# Patient Record
Sex: MALE | URBAN - METROPOLITAN AREA
[De-identification: names, ages, dates, MRNs, and addresses within clinical notes are randomized per-mention and may not be internally consistent; named-entity substitution may affect disease eponyms.]

---

## 2018-12-03 ENCOUNTER — AMBULATORY - HEALTHEAST (OUTPATIENT)
Dept: ADDICTION MEDICINE | Facility: CLINIC | Age: 33
End: 2018-12-03

## 2018-12-03 ENCOUNTER — OFFICE VISIT - HEALTHEAST (OUTPATIENT)
Dept: ADDICTION MEDICINE | Facility: CLINIC | Age: 33
End: 2018-12-03

## 2018-12-03 DIAGNOSIS — F12.20 CANNABIS USE DISORDER, SEVERE, DEPENDENCE (H): ICD-10-CM

## 2018-12-03 DIAGNOSIS — F10.20 SEVERE ALCOHOL USE DISORDER (H): ICD-10-CM

## 2018-12-04 ENCOUNTER — AMBULATORY - HEALTHEAST (OUTPATIENT)
Dept: ADDICTION MEDICINE | Facility: CLINIC | Age: 33
End: 2018-12-04

## 2018-12-05 ENCOUNTER — AMBULATORY - HEALTHEAST (OUTPATIENT)
Dept: ADDICTION MEDICINE | Facility: CLINIC | Age: 33
End: 2018-12-05

## 2018-12-05 ENCOUNTER — OFFICE VISIT - HEALTHEAST (OUTPATIENT)
Dept: ADDICTION MEDICINE | Facility: CLINIC | Age: 33
End: 2018-12-05

## 2018-12-05 DIAGNOSIS — F12.20 CANNABIS USE DISORDER, SEVERE, DEPENDENCE (H): ICD-10-CM

## 2018-12-06 ENCOUNTER — OFFICE VISIT - HEALTHEAST (OUTPATIENT)
Dept: ADDICTION MEDICINE | Facility: CLINIC | Age: 33
End: 2018-12-06

## 2018-12-06 DIAGNOSIS — F12.20 CANNABIS USE DISORDER, SEVERE, DEPENDENCE (H): ICD-10-CM

## 2018-12-07 ENCOUNTER — OFFICE VISIT - HEALTHEAST (OUTPATIENT)
Dept: ADDICTION MEDICINE | Facility: CLINIC | Age: 33
End: 2018-12-07

## 2018-12-07 DIAGNOSIS — F12.20 CANNABIS USE DISORDER, SEVERE, DEPENDENCE (H): ICD-10-CM

## 2018-12-10 ENCOUNTER — OFFICE VISIT - HEALTHEAST (OUTPATIENT)
Dept: ADDICTION MEDICINE | Facility: CLINIC | Age: 33
End: 2018-12-10

## 2018-12-10 DIAGNOSIS — F12.20 CANNABIS USE DISORDER, SEVERE, DEPENDENCE (H): ICD-10-CM

## 2018-12-11 ENCOUNTER — AMBULATORY - HEALTHEAST (OUTPATIENT)
Dept: ADDICTION MEDICINE | Facility: CLINIC | Age: 33
End: 2018-12-11

## 2018-12-13 ENCOUNTER — OFFICE VISIT - HEALTHEAST (OUTPATIENT)
Dept: ADDICTION MEDICINE | Facility: CLINIC | Age: 33
End: 2018-12-13

## 2018-12-13 DIAGNOSIS — F12.20 CANNABIS USE DISORDER, SEVERE, DEPENDENCE (H): ICD-10-CM

## 2018-12-14 ENCOUNTER — OFFICE VISIT - HEALTHEAST (OUTPATIENT)
Dept: ADDICTION MEDICINE | Facility: CLINIC | Age: 33
End: 2018-12-14

## 2018-12-14 DIAGNOSIS — F12.20 CANNABIS USE DISORDER, SEVERE, DEPENDENCE (H): ICD-10-CM

## 2018-12-17 ENCOUNTER — OFFICE VISIT - HEALTHEAST (OUTPATIENT)
Dept: ADDICTION MEDICINE | Facility: CLINIC | Age: 33
End: 2018-12-17

## 2018-12-17 DIAGNOSIS — F12.20 CANNABIS USE DISORDER, SEVERE, DEPENDENCE (H): ICD-10-CM

## 2018-12-18 ENCOUNTER — AMBULATORY - HEALTHEAST (OUTPATIENT)
Dept: ADDICTION MEDICINE | Facility: CLINIC | Age: 33
End: 2018-12-18

## 2018-12-19 ENCOUNTER — OFFICE VISIT - HEALTHEAST (OUTPATIENT)
Dept: ADDICTION MEDICINE | Facility: CLINIC | Age: 33
End: 2018-12-19

## 2018-12-19 DIAGNOSIS — F12.20 CANNABIS USE DISORDER, SEVERE, DEPENDENCE (H): ICD-10-CM

## 2018-12-20 ENCOUNTER — OFFICE VISIT - HEALTHEAST (OUTPATIENT)
Dept: ADDICTION MEDICINE | Facility: CLINIC | Age: 33
End: 2018-12-20

## 2018-12-20 DIAGNOSIS — F12.20 CANNABIS USE DISORDER, SEVERE, DEPENDENCE (H): ICD-10-CM

## 2018-12-21 ENCOUNTER — OFFICE VISIT - HEALTHEAST (OUTPATIENT)
Dept: ADDICTION MEDICINE | Facility: CLINIC | Age: 33
End: 2018-12-21

## 2018-12-21 DIAGNOSIS — F12.20 CANNABIS USE DISORDER, SEVERE, DEPENDENCE (H): ICD-10-CM

## 2018-12-24 ENCOUNTER — AMBULATORY - HEALTHEAST (OUTPATIENT)
Dept: ADDICTION MEDICINE | Facility: CLINIC | Age: 33
End: 2018-12-24

## 2018-12-24 ENCOUNTER — OFFICE VISIT - HEALTHEAST (OUTPATIENT)
Dept: ADDICTION MEDICINE | Facility: CLINIC | Age: 33
End: 2018-12-24

## 2018-12-24 DIAGNOSIS — F12.20 CANNABIS USE DISORDER, SEVERE, DEPENDENCE (H): ICD-10-CM

## 2018-12-26 ENCOUNTER — OFFICE VISIT - HEALTHEAST (OUTPATIENT)
Dept: ADDICTION MEDICINE | Facility: CLINIC | Age: 33
End: 2018-12-26

## 2018-12-26 DIAGNOSIS — F12.20 CANNABIS USE DISORDER, SEVERE, DEPENDENCE (H): ICD-10-CM

## 2018-12-27 ENCOUNTER — OFFICE VISIT - HEALTHEAST (OUTPATIENT)
Dept: ADDICTION MEDICINE | Facility: CLINIC | Age: 33
End: 2018-12-27

## 2018-12-27 DIAGNOSIS — F12.20 CANNABIS USE DISORDER, SEVERE, DEPENDENCE (H): ICD-10-CM

## 2018-12-28 ENCOUNTER — OFFICE VISIT - HEALTHEAST (OUTPATIENT)
Dept: ADDICTION MEDICINE | Facility: CLINIC | Age: 33
End: 2018-12-28

## 2018-12-28 DIAGNOSIS — F12.20 CANNABIS USE DISORDER, SEVERE, DEPENDENCE (H): ICD-10-CM

## 2018-12-31 ENCOUNTER — OFFICE VISIT - HEALTHEAST (OUTPATIENT)
Dept: ADDICTION MEDICINE | Facility: CLINIC | Age: 33
End: 2018-12-31

## 2018-12-31 DIAGNOSIS — F12.20 CANNABIS USE DISORDER, SEVERE, DEPENDENCE (H): ICD-10-CM

## 2019-01-02 ENCOUNTER — OFFICE VISIT - HEALTHEAST (OUTPATIENT)
Dept: ADDICTION MEDICINE | Facility: CLINIC | Age: 34
End: 2019-01-02

## 2019-01-02 ENCOUNTER — AMBULATORY - HEALTHEAST (OUTPATIENT)
Dept: ADDICTION MEDICINE | Facility: CLINIC | Age: 34
End: 2019-01-02

## 2019-01-02 DIAGNOSIS — F12.20 CANNABIS USE DISORDER, SEVERE, DEPENDENCE (H): ICD-10-CM

## 2019-01-03 ENCOUNTER — OFFICE VISIT - HEALTHEAST (OUTPATIENT)
Dept: ADDICTION MEDICINE | Facility: CLINIC | Age: 34
End: 2019-01-03

## 2019-01-03 DIAGNOSIS — F12.20 CANNABIS USE DISORDER, SEVERE, DEPENDENCE (H): ICD-10-CM

## 2019-01-04 ENCOUNTER — AMBULATORY - HEALTHEAST (OUTPATIENT)
Dept: ADDICTION MEDICINE | Facility: CLINIC | Age: 34
End: 2019-01-04

## 2019-01-04 ENCOUNTER — OFFICE VISIT - HEALTHEAST (OUTPATIENT)
Dept: ADDICTION MEDICINE | Facility: CLINIC | Age: 34
End: 2019-01-04

## 2019-01-04 DIAGNOSIS — F12.20 CANNABIS USE DISORDER, SEVERE, DEPENDENCE (H): ICD-10-CM

## 2019-01-07 ENCOUNTER — OFFICE VISIT - HEALTHEAST (OUTPATIENT)
Dept: ADDICTION MEDICINE | Facility: CLINIC | Age: 34
End: 2019-01-07

## 2019-01-07 DIAGNOSIS — F12.20 CANNABIS USE DISORDER, SEVERE, DEPENDENCE (H): ICD-10-CM

## 2019-01-08 ENCOUNTER — COMMUNICATION - HEALTHEAST (OUTPATIENT)
Dept: ADDICTION MEDICINE | Facility: CLINIC | Age: 34
End: 2019-01-08

## 2019-01-08 ENCOUNTER — AMBULATORY - HEALTHEAST (OUTPATIENT)
Dept: ADDICTION MEDICINE | Facility: CLINIC | Age: 34
End: 2019-01-08

## 2019-01-09 ENCOUNTER — OFFICE VISIT - HEALTHEAST (OUTPATIENT)
Dept: ADDICTION MEDICINE | Facility: CLINIC | Age: 34
End: 2019-01-09

## 2019-01-09 DIAGNOSIS — F12.20 CANNABIS USE DISORDER, SEVERE, DEPENDENCE (H): ICD-10-CM

## 2019-01-10 ENCOUNTER — OFFICE VISIT - HEALTHEAST (OUTPATIENT)
Dept: ADDICTION MEDICINE | Facility: CLINIC | Age: 34
End: 2019-01-10

## 2019-01-10 DIAGNOSIS — F12.20 CANNABIS USE DISORDER, SEVERE, DEPENDENCE (H): ICD-10-CM

## 2019-01-11 ENCOUNTER — OFFICE VISIT - HEALTHEAST (OUTPATIENT)
Dept: ADDICTION MEDICINE | Facility: CLINIC | Age: 34
End: 2019-01-11

## 2019-01-11 DIAGNOSIS — F12.20 CANNABIS USE DISORDER, SEVERE, DEPENDENCE (H): ICD-10-CM

## 2019-01-14 ENCOUNTER — OFFICE VISIT - HEALTHEAST (OUTPATIENT)
Dept: ADDICTION MEDICINE | Facility: CLINIC | Age: 34
End: 2019-01-14

## 2019-01-14 DIAGNOSIS — F12.20 CANNABIS USE DISORDER, SEVERE, DEPENDENCE (H): ICD-10-CM

## 2019-01-15 ENCOUNTER — AMBULATORY - HEALTHEAST (OUTPATIENT)
Dept: ADDICTION MEDICINE | Facility: CLINIC | Age: 34
End: 2019-01-15

## 2019-01-16 ENCOUNTER — OFFICE VISIT - HEALTHEAST (OUTPATIENT)
Dept: ADDICTION MEDICINE | Facility: CLINIC | Age: 34
End: 2019-01-16

## 2019-01-16 DIAGNOSIS — F12.20 CANNABIS USE DISORDER, SEVERE, DEPENDENCE (H): ICD-10-CM

## 2019-01-17 ENCOUNTER — OFFICE VISIT - HEALTHEAST (OUTPATIENT)
Dept: ADDICTION MEDICINE | Facility: CLINIC | Age: 34
End: 2019-01-17

## 2019-01-17 DIAGNOSIS — F12.20 CANNABIS USE DISORDER, SEVERE, DEPENDENCE (H): ICD-10-CM

## 2019-01-18 ENCOUNTER — OFFICE VISIT - HEALTHEAST (OUTPATIENT)
Dept: ADDICTION MEDICINE | Facility: CLINIC | Age: 34
End: 2019-01-18

## 2019-01-18 DIAGNOSIS — F12.20 CANNABIS USE DISORDER, SEVERE, DEPENDENCE (H): ICD-10-CM

## 2019-01-21 ENCOUNTER — OFFICE VISIT - HEALTHEAST (OUTPATIENT)
Dept: ADDICTION MEDICINE | Facility: CLINIC | Age: 34
End: 2019-01-21

## 2019-01-21 DIAGNOSIS — F12.20 CANNABIS USE DISORDER, SEVERE, DEPENDENCE (H): ICD-10-CM

## 2019-01-22 ENCOUNTER — AMBULATORY - HEALTHEAST (OUTPATIENT)
Dept: ADDICTION MEDICINE | Facility: CLINIC | Age: 34
End: 2019-01-22

## 2019-01-23 ENCOUNTER — OFFICE VISIT - HEALTHEAST (OUTPATIENT)
Dept: ADDICTION MEDICINE | Facility: CLINIC | Age: 34
End: 2019-01-23

## 2019-01-23 DIAGNOSIS — F12.20 CANNABIS USE DISORDER, SEVERE, DEPENDENCE (H): ICD-10-CM

## 2019-01-28 ENCOUNTER — OFFICE VISIT - HEALTHEAST (OUTPATIENT)
Dept: ADDICTION MEDICINE | Facility: CLINIC | Age: 34
End: 2019-01-28

## 2019-01-28 DIAGNOSIS — F12.20 CANNABIS USE DISORDER, SEVERE, DEPENDENCE (H): ICD-10-CM

## 2019-01-29 ENCOUNTER — AMBULATORY - HEALTHEAST (OUTPATIENT)
Dept: ADDICTION MEDICINE | Facility: CLINIC | Age: 34
End: 2019-01-29

## 2019-01-31 ENCOUNTER — AMBULATORY - HEALTHEAST (OUTPATIENT)
Dept: ADDICTION MEDICINE | Facility: CLINIC | Age: 34
End: 2019-01-31

## 2019-01-31 ENCOUNTER — OFFICE VISIT - HEALTHEAST (OUTPATIENT)
Dept: ADDICTION MEDICINE | Facility: CLINIC | Age: 34
End: 2019-01-31

## 2019-01-31 ENCOUNTER — COMMUNICATION - HEALTHEAST (OUTPATIENT)
Dept: ADDICTION MEDICINE | Facility: CLINIC | Age: 34
End: 2019-01-31

## 2019-01-31 DIAGNOSIS — F12.20 CANNABIS USE DISORDER, SEVERE, DEPENDENCE (H): ICD-10-CM

## 2019-02-07 ENCOUNTER — OFFICE VISIT - HEALTHEAST (OUTPATIENT)
Dept: ADDICTION MEDICINE | Facility: CLINIC | Age: 34
End: 2019-02-07

## 2019-02-07 DIAGNOSIS — F12.20 CANNABIS USE DISORDER, SEVERE, DEPENDENCE (H): ICD-10-CM

## 2019-02-11 ENCOUNTER — AMBULATORY - HEALTHEAST (OUTPATIENT)
Dept: ADDICTION MEDICINE | Facility: CLINIC | Age: 34
End: 2019-02-11

## 2019-02-14 ENCOUNTER — OFFICE VISIT - HEALTHEAST (OUTPATIENT)
Dept: ADDICTION MEDICINE | Facility: CLINIC | Age: 34
End: 2019-02-14

## 2019-02-14 DIAGNOSIS — F12.20 CANNABIS USE DISORDER, SEVERE, DEPENDENCE (H): ICD-10-CM

## 2019-02-18 ENCOUNTER — AMBULATORY - HEALTHEAST (OUTPATIENT)
Dept: ADDICTION MEDICINE | Facility: CLINIC | Age: 34
End: 2019-02-18

## 2019-02-20 ENCOUNTER — AMBULATORY - HEALTHEAST (OUTPATIENT)
Dept: ADDICTION MEDICINE | Facility: CLINIC | Age: 34
End: 2019-02-20

## 2019-02-21 ENCOUNTER — OFFICE VISIT - HEALTHEAST (OUTPATIENT)
Dept: ADDICTION MEDICINE | Facility: CLINIC | Age: 34
End: 2019-02-21

## 2019-02-21 DIAGNOSIS — F12.20 CANNABIS USE DISORDER, SEVERE, DEPENDENCE (H): ICD-10-CM

## 2019-02-25 ENCOUNTER — AMBULATORY - HEALTHEAST (OUTPATIENT)
Dept: ADDICTION MEDICINE | Facility: CLINIC | Age: 34
End: 2019-02-25

## 2019-02-28 ENCOUNTER — OFFICE VISIT - HEALTHEAST (OUTPATIENT)
Dept: ADDICTION MEDICINE | Facility: CLINIC | Age: 34
End: 2019-02-28

## 2019-02-28 DIAGNOSIS — F12.20 CANNABIS USE DISORDER, SEVERE, DEPENDENCE (H): ICD-10-CM

## 2019-03-04 ENCOUNTER — AMBULATORY - HEALTHEAST (OUTPATIENT)
Dept: ADDICTION MEDICINE | Facility: CLINIC | Age: 34
End: 2019-03-04

## 2019-03-07 ENCOUNTER — OFFICE VISIT - HEALTHEAST (OUTPATIENT)
Dept: ADDICTION MEDICINE | Facility: CLINIC | Age: 34
End: 2019-03-07

## 2019-03-07 DIAGNOSIS — F12.20 CANNABIS USE DISORDER, SEVERE, DEPENDENCE (H): ICD-10-CM

## 2019-03-12 ENCOUNTER — AMBULATORY - HEALTHEAST (OUTPATIENT)
Dept: ADDICTION MEDICINE | Facility: CLINIC | Age: 34
End: 2019-03-12

## 2019-03-14 ENCOUNTER — OFFICE VISIT - HEALTHEAST (OUTPATIENT)
Dept: ADDICTION MEDICINE | Facility: CLINIC | Age: 34
End: 2019-03-14

## 2019-03-14 DIAGNOSIS — F12.20 CANNABIS USE DISORDER, SEVERE, DEPENDENCE (H): ICD-10-CM

## 2019-03-18 ENCOUNTER — AMBULATORY - HEALTHEAST (OUTPATIENT)
Dept: ADDICTION MEDICINE | Facility: CLINIC | Age: 34
End: 2019-03-18

## 2019-03-21 ENCOUNTER — OFFICE VISIT - HEALTHEAST (OUTPATIENT)
Dept: ADDICTION MEDICINE | Facility: CLINIC | Age: 34
End: 2019-03-21

## 2019-03-21 DIAGNOSIS — F12.20 CANNABIS USE DISORDER, SEVERE, DEPENDENCE (H): ICD-10-CM

## 2019-03-25 ENCOUNTER — AMBULATORY - HEALTHEAST (OUTPATIENT)
Dept: ADDICTION MEDICINE | Facility: CLINIC | Age: 34
End: 2019-03-25

## 2019-03-28 ENCOUNTER — OFFICE VISIT - HEALTHEAST (OUTPATIENT)
Dept: ADDICTION MEDICINE | Facility: CLINIC | Age: 34
End: 2019-03-28

## 2019-03-28 DIAGNOSIS — F12.20 CANNABIS USE DISORDER, SEVERE, DEPENDENCE (H): ICD-10-CM

## 2019-04-01 ENCOUNTER — AMBULATORY - HEALTHEAST (OUTPATIENT)
Dept: ADDICTION MEDICINE | Facility: CLINIC | Age: 34
End: 2019-04-01

## 2019-04-04 ENCOUNTER — OFFICE VISIT - HEALTHEAST (OUTPATIENT)
Dept: ADDICTION MEDICINE | Facility: CLINIC | Age: 34
End: 2019-04-04

## 2019-04-04 DIAGNOSIS — F12.20 CANNABIS USE DISORDER, SEVERE, DEPENDENCE (H): ICD-10-CM

## 2019-04-08 ENCOUNTER — AMBULATORY - HEALTHEAST (OUTPATIENT)
Dept: ADDICTION MEDICINE | Facility: CLINIC | Age: 34
End: 2019-04-08

## 2019-04-11 ENCOUNTER — OFFICE VISIT - HEALTHEAST (OUTPATIENT)
Dept: ADDICTION MEDICINE | Facility: CLINIC | Age: 34
End: 2019-04-11

## 2019-04-11 DIAGNOSIS — F12.20 CANNABIS USE DISORDER, SEVERE, DEPENDENCE (H): ICD-10-CM

## 2019-04-15 ENCOUNTER — AMBULATORY - HEALTHEAST (OUTPATIENT)
Dept: ADDICTION MEDICINE | Facility: CLINIC | Age: 34
End: 2019-04-15

## 2019-04-18 ENCOUNTER — OFFICE VISIT - HEALTHEAST (OUTPATIENT)
Dept: ADDICTION MEDICINE | Facility: CLINIC | Age: 34
End: 2019-04-18

## 2019-04-18 DIAGNOSIS — F12.20 CANNABIS USE DISORDER, SEVERE, DEPENDENCE (H): ICD-10-CM

## 2019-04-22 ENCOUNTER — AMBULATORY - HEALTHEAST (OUTPATIENT)
Dept: ADDICTION MEDICINE | Facility: CLINIC | Age: 34
End: 2019-04-22

## 2019-04-25 ENCOUNTER — OFFICE VISIT - HEALTHEAST (OUTPATIENT)
Dept: ADDICTION MEDICINE | Facility: CLINIC | Age: 34
End: 2019-04-25

## 2019-04-25 DIAGNOSIS — F12.20 CANNABIS USE DISORDER, SEVERE, DEPENDENCE (H): ICD-10-CM

## 2019-04-29 ENCOUNTER — AMBULATORY - HEALTHEAST (OUTPATIENT)
Dept: ADDICTION MEDICINE | Facility: CLINIC | Age: 34
End: 2019-04-29

## 2019-05-06 ENCOUNTER — AMBULATORY - HEALTHEAST (OUTPATIENT)
Dept: ADDICTION MEDICINE | Facility: CLINIC | Age: 34
End: 2019-05-06

## 2019-05-14 ENCOUNTER — AMBULATORY - HEALTHEAST (OUTPATIENT)
Dept: ADDICTION MEDICINE | Facility: CLINIC | Age: 34
End: 2019-05-14

## 2021-05-27 NOTE — PROGRESS NOTES
Jaspal Soler attended 2 hours of group therapy today.    Total group size of 3.    4/11/2019 7:40 PM Shazia Lange

## 2021-05-27 NOTE — PROGRESS NOTES
Weekly Progress Note  Jaspal Soler  1985  [id number]    D) Pt attended 1 groups this week with 0 absences. Patient attended 0 individual sessions this week.   A) Staff facilitated groups and reviewed tx progress. [facility] for [facility].   R) No VAP needed at this time.   Any significant events, defines as events that impact patients relationship with others inside and outside of treatment Yes: The patient resides a significant distance from his family, he reports significant loneliness due to this. Patient also has pending charges for a double stabbing.     Indicate any changes or monitoring of physical or mental health problems No    Indicate involvement by any outside supports Yes    IAPP reviewed and modified as needed. NA  Pt working on the following dimensions:    Dimension #1 - Withdrawal Potential - Risk 0. The patient reports his date of last use of marijuana and alcohol to be on 8/10/19.  Specific goals from treatment plan addressed this week: Maintain abstinence while attending Recovery Maintenance as a way of gaining awareness of your thoughts, feelings and aspirations for recovery. Report any relapses, if any, on any substances of abuse to staff immediately.   Effectiveness of strategies: The patient did not endorse any use over the last week.      Dimension #2 - Biomedical - Risk 0. The patient does not endorse any emergent biomedical concerns. He does not have current health insurance and only access to medical care is through an ED.   Specific goals from treatment plan addressed this week: Get proper rest, nutrition, and exercise in order to promote good brain and body function. Inform staff immediately of any changes in your health that may affect your active participation in group therapy or attendance.  Effectiveness of strategies: The patient reports no emergent biomedical concerns. He reports he has been working and that it is very physical.     Dimension #3 - Emotional/Behavioral/Cognitive -  Risk 1. The patient does not have any formal mental health diagnoses however does report symptoms of depression. Patient has a history of abandonment issues as well as grew up in an alcoholic home. Patient also appears to struggle with anger and impulsivity as evidence by his pending legal charges (stabbing).   Specific goals from treatment plan addressed this week:   Report to staff any changes in your mental health that may affect your attendance or participation in group therapy.   Effectiveness of strategies: We discussed some OCD tendencies and ways he can work on his impulses, we talked about delaying the impulse for 5 minutes rather than acting on the urge right away. He talked about how he was going and checking the locks repeatedly and was finding it to be something that he had to do or bad things would happen. He reports that he is also binge eating as a coping skill, he stated he feels very down, sad and mad when these things happen but he feels like he needs to keep eating, cleaning and checking his locks. We are still working on insurance and will be referring patient to mental health services upon receipt. He reported he was going to look into them and call because he continues to struggle with his OCD and emotions. He reports having an appointment this week at [address] [place] [address] for his mental health. He did also endorse that his emotions are all over the place and he is having trouble with emotional regulation- coping skills were discussed and it appears that most of emotional dysregulation is aligned with his upcoming court date.     Dimension #4 - Treatment Acceptance/Resistance - Risk 0. The patient is mandated to treatment by the courts however verbalizes motivation for sobriety.   Specific goals from treatment plan addressed this week:  Attend Recovery Maintenance groups Thursday from 6:00pm to 8:00pm and share thoughts, feelings and urges to use, as well as sober supports with staff and  peers.  Effectiveness of strategies: Active and engaged group member. He reports he is motivated for change however feels like it is going to be hard especially as his court date continues to get closer. He is in the action stage of change at this time. No change- we do have a 1x1 scheduled this week.       Dimension #5 - Relapse Potential - Risk 2. The patient has some coping skills and knowledge of addiction concepts. This is the patients first formal treatment. He has been able to identify triggers for use and how his use has impacted his life.   Specific goals from treatment plan addressed this week:  Dealing effectively with relapse triggers and stressors while building coping skills in order to handle life events without resorting to drug/alcohol use.   Effectiveness of strategies: Some urges to use related to going to court and getting an I dont care attitude. We discussed the benefit of staying on course and being able to do this for himself rather than for others.      Dimension #6 - Recovery Environment - Risk 2. The [facility] [place] He is currently on house arrest and is compliant with the monitoring system. The patient has a pending case for a stabbing and is on conditional release. Patient is currently working at [employer]  on the day shift. He does not have any current support outside of his [facility], reporting that all of his family resides in the [facility] and minimal sob support group attendance.   Specific goals from treatment plan addressed this week:  Work on legal concerns.    Effectiveness of strategies:  The patient reports that his  is trying to help him with a deal at this time reporting that they have offered him 3-5 years in long-term, he is hesitant to take a deal as he wants to continue to initiate changes in programming. We discussed taking with his  about his thoughts and coming up with a direct plan.      T) Treatment plan updated no.  Patient  notified and in agreement NA.   Patient has completed 84 hours of 84. Pt has completed 20 hours of Recovery Maintenance aftercare.  Projected discharge date is 4/26/2019. Current discharge plan is TBD.     ROSENDO Matias  4/15/2019, 11:40 AM      Recovery Maintenance Aftercare  Psycho-Educational Curriculum  Date Attended  Mindfulness Curriculum  Date Attended    Self Care  4/11 Neurobiology of Mindfulness    Staying motivated in Long Term Recovery-h/o  Core Attitudes    Sleep  3/28 Daily practice    Relapse Prevention Quiz-h/o  Awareness    Prioritizing Sober Living-24hours a day h/o  Thoughts    Symptoms of Relapse-h/o  Emotions    Tools for Recovery-h/o  Physical body     Acceptance-group exercise  Intention    Finding gratitude  C.A.L.M.    GOALS  4/4 Clarity    POT  3/7/19     Sober support groups  Optimism    Have a Relapse Prevention Plan-written  More Mind  2/28

## 2021-05-27 NOTE — PROGRESS NOTES
Jaspal Soler attended 2 hours of group therapy today.    Total group size of 5.    4/4/2019 8:06 PM Shazia Lange

## 2021-05-27 NOTE — PROGRESS NOTES
Weekly Progress Note  Jaspal Soler  1985  [id number]    D) Pt attended 1 groups this week with 0 absences. Patient attended 0 individual sessions this week.   A) Staff facilitated groups and reviewed tx progress. [facility] for [facility].   R) No VAP needed at this time.   Any significant events, defines as events that impact patients relationship with others inside and outside of treatment Yes: The patient resides a significant distance from his family, he reports significant loneliness due to this. Patient also has pending charges for a double stabbing.     Indicate any changes or monitoring of physical or mental health problems No    Indicate involvement by any outside supports Yes    IAPP reviewed and modified as needed. NA  Pt working on the following dimensions:    Dimension #1 - Withdrawal Potential - Risk 0. The patient reports his date of last use of marijuana and alcohol to be on 8/10/19.  Specific goals from treatment plan addressed this week: Maintain abstinence while attending Recovery Maintenance as a way of gaining awareness of your thoughts, feelings and aspirations for recovery. Report any relapses, if any, on any substances of abuse to staff immediately.   Effectiveness of strategies: Patient continues to report maintained abstinence.      Dimension #2 - Biomedical - Risk 0. The patient does not endorse any emergent biomedical concerns. He does not have current health insurance and only access to medical care is through an ED.   Specific goals from treatment plan addressed this week: Get proper rest, nutrition, and exercise in order to promote good brain and body function. Inform staff immediately of any changes in your health that may affect your active participation in group therapy or attendance.  Effectiveness of strategies: Patient does not have insurance at this time- all paperwork has been sent in for a PMAP. This writer has checked in with Ana Gutierrez regarding this.       Dimension #3 -  Emotional/Behavioral/Cognitive - Risk 1. The patient does not have any formal mental health diagnoses however does report symptoms of depression. Patient has a history of abandonment issues as well as grew up in an alcoholic home. Patient also appears to struggle with anger and impulsivity as evidence by his pending legal charges (stabbing).   Specific goals from treatment plan addressed this week:   Report to staff any changes in your mental health that may affect your attendance or participation in group therapy.   Effectiveness of strategies: The patient reported over the last week that he has increased his cleaning and was waking up in the middle of the night to put his sheets in the washer, we discussed some OCD tendencies and ways he can work on his impulses, we talked about delaying the impulse for 5 minutes rather than acting on the urge right away. He talked about how he was going and checking the locks repeatedly and was finding it to be something that he had to do or bad things would happen. He reports that he is also binge eating as a coping skill, he stated he feels very down, sad and mad when these things happen but he feels like he needs to keep eating, cleaning and checking his locks. We are still working on insurance and will be referring patient to mental health services upon receipt. The patient has been given a flyer for [facility]. He reported he was going to look into them and call because he continues to struggle with his OCD and emotions. He was unable to get down to the clinic over the last week.     Dimension #4 - Treatment Acceptance/Resistance - Risk 0. The patient is mandated to treatment by the courts however verbalizes motivation for sobriety.   Specific goals from treatment plan addressed this week:  Attend Recovery Maintenance groups Thursday from 6:00pm to 8:00pm and share thoughts, feelings and urges to use, as well as sober supports with staff and  peers.  Effectiveness of strategies: Active and engaged group member. He reports he is motivated for change however feels like it is going to be hard especially as his court date continues to get closer. He is in the action stage of change at this time.      Dimension #5 - Relapse Potential - Risk 2. The patient has some coping skills and knowledge of addiction concepts. This is the patients first formal treatment. He has been able to identify triggers for use and how his use has impacted his life.   Specific goals from treatment plan addressed this week:  Dealing effectively with relapse triggers and stressors while building coping skills in order to handle life events without resorting to drug/alcohol use.   Effectiveness of strategies: No urges or triggers reporting that he continues to have urges for eating and for OCD tendencies. Trying to apply coping skills.     Dimension #6 - Recovery Environment - Risk 2. The patient is residing at [facility]. He is currently on house arrest and is compliant with the monitoring system. The patient has a pending case for a stabbing and is on conditional release. Patient is currently working at [employer]  on the day shift. He does not have any current support outside of his [facility], reporting that all of his family resides in the [facility] and minimal [facility] support group attendance.   Specific goals from treatment plan addressed this week:  Work on legal concerns.    Effectiveness of strategies:  The patient reports that his  is trying to help him with a deal at this time reporting that they have offered him 3-5 years in snf, he is hesitant to take a deal as he wants to continue to initiate changes in programming. We discussed taking with his  about his thoughts and coming up with a direct plan.      T) Treatment plan updated no.  Patient notified and in agreement NA.   Patient has completed 84 hours of 84. Pt has completed 18 hours of Recovery  Maintenance aftercare.  Projected discharge date is 4/26/2019. Current discharge plan is TBD.     Shazia Lange [facility]  4/8/2019, 2:04 PM      Recovery Maintenance Aftercare  Psycho-Educational Curriculum  Date Attended  Mindfulness Curriculum  Date Attended    Crossing over into Sober Living--Pat Alcoholism h/o  Neurobiology of Mindfulness    Staying motivated in Long Term Recovery-h/o  Core Attitudes    Sleep  3/28 Daily practice    Relapse Prevention Quiz-h/o  Awareness    Prioritizing Sober Living-24hours a day h/o  Thoughts    Symptoms of Relapse-h/o  Emotions    Tools for Recovery-h/o  Physical body     Acceptance-group exercise  Intention    Finding gratitude  C.A.L.M.    GOALS  4/4 Clarity    POT  3/7/19     Sober support groups  Optimism    Have a Relapse Prevention Plan-written  More Mind  2/28

## 2021-05-27 NOTE — PROGRESS NOTES
Weekly Progress Note  Jaspal Soler  1985  [id number]    D) Pt attended 1 groups this week with 0 absences. Patient attended 0 individual sessions this week.   A) Staff facilitated groups and reviewed tx progress. [facility] for [facility].   R) No VAP needed at this time.   Any significant events, defines as events that impact patients relationship with others inside and outside of treatment Yes: The patient resides a significant distance from his family, he reports significant loneliness due to this. Patient also has pending charges for a stabbing.     Indicate any changes or monitoring of physical or mental health problems No    Indicate involvement by any outside supports Yes    IAPP reviewed and modified as needed. NA  Pt working on the following dimensions:    Dimension #1 - Withdrawal Potential - Risk 0. The patient reports his date of last use of marijuana and alcohol to be on 8/10/19.  Specific goals from treatment plan addressed this week: Maintain abstinence while attending Recovery Maintenance as a way of gaining awareness of your thoughts, feelings and aspirations for recovery. Report any relapses, if any, on any substances of abuse to staff immediately.   Effectiveness of strategies: The patient has not endorsed any use over the last week.      Dimension #2 - Biomedical - Risk 0. The patient does not endorse any emergent biomedical concerns. He does not have current health insurance and only access to medical care is through an ED.   Specific goals from treatment plan addressed this week: Get proper rest, nutrition, and exercise in order to promote good brain and body function. Inform staff immediately of any changes in your health that may affect your active participation in group therapy or attendance.  Effectiveness of strategies: Patient does not have insurance at this time- all paperwork has been sent in for a PMAP.      Dimension #3 - Emotional/Behavioral/Cognitive - Risk 1. The patient does not have  any formal mental health diagnoses however does report symptoms of depression. Patient has a history of abandonment issues as well as grew up in an alcoholic home. Patient also appears to struggle with anger and impulsivity as evidence by his pending legal charges (stabbing).   Specific goals from treatment plan addressed this week:   Report to staff any changes in your mental health that may affect your attendance or participation in group therapy.   Effectiveness of strategies: The patient reported over the last week that he has increased his cleaning and was waking up in the middle of the night to put his sheets in the washer, we discussed some OCD tendencies and ways he can work on his impulses, we talked about delaying the impulse for 5 minutes rather than acting on the urge right away. He talked about how he was going and checking the locks repeatedly and was finding it to be something that he had to do or bad things would happen. He reports that he is also binge eating as a coping skill, he stated he feels very down, sad and mad when these things happen but he feels like he needs to keep eating, cleaning and checking his locks. We are still working on insurance and will be referring patient to mental health services upon receipt. The patient has been given a flyer for [facility]. He reported he was going to look into them and call because he continues to struggle with his OCD and emotions.     Dimension #4 - Treatment Acceptance/Resistance - Risk 0. The patient is mandated to treatment by the courts however verbalizes motivation for sobriety.   Specific goals from treatment plan addressed this week:  Attend Recovery Maintenance groups Thursday from 6:00pm to 8:00pm and share thoughts, feelings and urges to use, as well as sober supports with staff and peers.  Effectiveness of strategies: Active and engaged group member. He reports he is motivated for change however feels like it is  going to be hard especially as his court date continues to get closer.      Dimension #5 - Relapse Potential - Risk 2. The patient has some coping skills and knowledge of addiction concepts. This is the patients first formal treatment. He has been able to identify triggers for use and how his use has impacted his life.   Specific goals from treatment plan addressed this week:  Dealing effectively with relapse triggers and stressors while building coping skills in order to handle life events without resorting to drug/alcohol use.   Effectiveness of strategies: The patient reports no urges or triggers over the last week. Not endorsing any urges to use however urges for compulsions and binge eating.      Dimension #6 - Recovery Environment - Risk 2. The patient is residing at [facility]. He is currently on house arrest and is compliant with the monitoring system. The patient has a pending case for a stabbing and is on conditional release. Patient is currently working at [employer]  on the day shift. He does not have any current support outside of his [facility], reporting that all of his family resides in the [facility] and minimal sober support group attendance.   Specific goals from treatment plan addressed this week:  Find 2 sober support groups you feel comfortable attending on a weekly basis and inform counselor how these meetings are impacting you.   Effectiveness of strategies:  No sober support group attendance. He reports his court case got pushed to 4/27/19, he is very scared of the consequences as he has 2 pending stabbing charges and the minimum recommended time is 6 years in skilled nursing. He has significant worry and fear about upcoming case. He has been going to sober support meetings.     T) Treatment plan updated no.  Patient notified and in agreement NA.   Patient has completed 84 hours of 84. Pt has completed 16 hours of Recovery Maintenance aftercare.  Projected discharge date is 4/26/2019. Current  discharge plan is TBD.     ROSENDO Matias  4/1/2019, 12:27 PM      Recovery Maintenance Aftercare  Psycho-Educational Curriculum  Date Attended  Mindfulness Curriculum  Date Attended    Crossing over into Sober Living--Pat Alcoholism h/o  Neurobiology of Mindfulness    Staying motivated in Long Term Recovery-h/o  Core Attitudes    Sleep  3/28 Daily practice    Relapse Prevention Quiz-h/o  Awareness    Prioritizing Sober Living-24hours a day h/o  Thoughts    Symptoms of Relapse-h/o  Emotions    Tools for Recovery-h/o  Physical body     Acceptance-group exercise  Intention    Finding gratitude  C.A.L.M.    Building sober habits  Clarity    POT  3/7/19     Sober support groups  Optimism    Have a Relapse Prevention Plan-written  More Mind  2/28

## 2021-05-27 NOTE — PROGRESS NOTES
Weekly Progress Note  Jaspal Soler  1985  [id number]    D) Pt attended 1 groups this week with 0 absences. Patient attended 0 individual sessions this week.   A) Staff facilitated groups and reviewed tx progress. Assessed for [facility].   R) No VAP needed at this time.   Any significant events, defines as events that impact patients relationship with others inside and outside of treatment Yes: The patient resides a significant distance from his family, he reports significant loneliness due to this. Patient also has pending charges for a stabbing.     Indicate any changes or monitoring of physical or mental health problems No    Indicate involvement by any outside supports Yes    IAPP reviewed and modified as needed. NA  Pt working on the following dimensions:    Dimension #1 - Withdrawal Potential - Risk 0. The patient reports his date of last use of marijuana and alcohol to be on 8/10/19.  Specific goals from treatment plan addressed this week: Maintain abstinence while attending Recovery Maintenance as a way of gaining awareness of your thoughts, feelings and aspirations for recovery. Report any relapses, if any, on any substances of abuse to staff immediately.   Effectiveness of strategies: The patient reports continued abstinence.      Dimension #2 - Biomedical - Risk 0. The patient does not endorse any emergent biomedical concerns. He does not have current health insurance and only access to medical care is through an ED.   Specific goals from treatment plan addressed this week: Get proper rest, nutrition, and exercise in order to promote good brain and body function. Inform staff immediately of any changes in your health that may affect your active participation in group therapy or attendance.  Effectiveness of strategies: No medical concerns at this time. We are still working on getting insurance at this time for the patient.        Dimension #3 - Emotional/Behavioral/Cognitive - Risk 1. The patient does not  have any formal mental health diagnoses however does report symptoms of depression. Patient has a history of abandonment issues as well as grew up in an alcoholic home. Patient also appears to struggle with anger and impulsivity as evidence by his pending legal charges (stabbing).   Specific goals from treatment plan addressed this week:   Report to staff any changes in your mental health that may affect your attendance or participation in group therapy.   Effectiveness of strategies: The patient reported over the last week that he has increased his cleaning and was waking up in the middle of the night to put his sheets in the washer, we discussed some OCD tendencies and ways he can work on his impulses, we talked about delaying the impulse for 5 minutes rather than acting on the urge right away. He talked about how he was going and checking the locks repeatedly and was finding it to be something that he had to do or bad things would happen. He reports that he is also binge eating as a coping skill, he stated he feels very down, sad and mad when these things happen but he feels like he needs to keep eating, cleaning and checking his locks. We are still working on insurance and will be referring patient to mental health services upon receipt.       Dimension #4 - Treatment Acceptance/Resistance - Risk 0. The patient is mandated to treatment by the courts however verbalizes motivation for sobriety.   Specific goals from treatment plan addressed this week:  Attend Recovery Maintenance groups Thursday from 6:00pm to 8:00pm and share thoughts, feelings and urges to use, as well as sober supports with staff and peers.  Effectiveness of strategies: The patient is a great group member and is very active in the group setting. No change.     Dimension #5 - Relapse Potential - Risk 2. The patient has some coping skills and knowledge of addiction concepts. This is the patients first formal treatment. He has been able to  identify triggers for use and how his use has impacted his life.   Specific goals from treatment plan addressed this week:  Dealing effectively with relapse triggers and stressors while building coping skills in order to handle life events without resorting to drug/alcohol use.   Effectiveness of strategies: The patient reports no urges or triggers over the last week. Not endorsing any urges to use however urges for compulsions and binge eating.      Dimension #6 - Recovery Environment - Risk 2. The patient is residing at [facility]. He is currently on house arrest and is compliant with the monitoring system. The patient has a pending case for a stabbing and is on conditional release. Patient is currently working at [employer]  on the day shift. He does not have any current support outside of his [facility], reporting that all of his family resides in the [facility] and minimal sober support group attendance.   Specific goals from treatment plan addressed this week:  Find 2 sober support groups you feel comfortable attending on a weekly basis and inform counselor how these meetings are impacting you.   Effectiveness of strategies:  No sober support group attendance. He reports his court case got pushed to 4/27/19, he is very scared of the consequences as he has 2 pending stabbing charges and the minimum recommended time is 6 years in skilled nursing. He has significant worry and fear about upcoming case.      T) Treatment plan updated no.  Patient notified and in agreement NA.   Patient has completed 84 hours of 84. Pt has completed 14 hours of Recovery Maintenance aftercare.  Projected discharge date is 4/26/2019. Current discharge plan is TBD.     ROSENDO Matias  3/25/2019, 11:56 AM      Recovery Maintenance Aftercare  Psycho-Educational Curriculum  Date Attended  Mindfulness Curriculum  Date Attended    Crossing over into Sober Living--Pat Alcoholism h/o  Neurobiology of Mindfulness    Staying motivated in  Long Term Recovery-h/o  Core Attitudes    Keeping it Simple-h/o  Daily practice    Relapse Prevention Quiz-h/o  Awareness    Prioritizing Sober Living-24hours a day h/o  Thoughts    Symptoms of Relapse-h/o  Emotions    Tools for Recovery-h/o  Physical body     Acceptance-group exercise  Intention    Finding gratitude  [email or web address]    Building sober habits  Clarity    POT  3/7/19     Sober support groups  Optimism    Have a Relapse Prevention Plan-written  More Mind  2/28

## 2021-05-27 NOTE — PROGRESS NOTES
Jaspal Soler attended 2 hours of group therapy today.    Total group size of 7.    3/28/2019 8:05 PM Shazia Lange

## 2021-05-28 NOTE — PROGRESS NOTES
The patient was not present for group on 5/2/19. Due to this no weekly will be entered as the patient was unable to be assessed.     ROSENDO Matias 5/6/2019 3:08 PM

## 2021-05-28 NOTE — PROGRESS NOTES
Jaspal Soler attended 2 hours of group therapy today.    Total group size of 5.    4/25/2019 8:04 PM Shazia Lange

## 2021-05-28 NOTE — PROGRESS NOTES
Jaspal Soler attended 2 hours of group therapy today.    Total group size of 9.    4/18/2019 8:31 PM Sahzia Lange

## 2021-05-28 NOTE — PROGRESS NOTES
Weekly Progress Note  Jaspal Soler  1985  [id number]     D) Pt attended 1 groups this week with 0 absences. Patient attended 0 individual sessions this week.   A) Staff facilitated groups and reviewed tx progress. Assessed for [facility].   R) No VAP needed at this time.   Any significant events, defines as events that impact patients relationship with others inside and outside of treatment Yes: The patient has mental health concerns that are not being addressed due to a lack of insurance. Patient had a court case on 4/29/19 for his pending charges.       Indicate any changes or monitoring of physical or mental health problems:  Patient has NO mental health services at this time and would greatly benefit from services.      Indicate involvement by any outside supports: Patient has minimal sober support outside of his sober living environment.      IAPP reviewed and modified as needed. NA  Pt working on the following dimensions:     Dimension #1 - Withdrawal Potential - Risk 0. The patient reports his date of last use of marijuana and alcohol to be on 8/10/19.  Specific goals from treatment plan addressed this week: Maintain abstinence while attending Recovery Maintenance as a way of gaining awareness of your thoughts, feelings and aspirations for recovery. Report any relapses, if any, on any substances of abuse to staff immediately.   Effectiveness of strategies: The patient reported no use over the last week.        Dimension #2 - Biomedical - Risk 0. The patient does not endorse any emergent biomedical concerns. He does not have current health insurance and only access to medical care is through an ED.   Specific goals from treatment plan addressed this week: Get proper rest, nutrition, and exercise in order to promote good brain and body function. Inform staff immediately of any changes in your health that may affect your active participation in group therapy or attendance.  Effectiveness of strategies: The  "patient reports no emergent biomedical concerns. He did express concerns about his weight stating that he is eating more for comfort (see dimension 3). We talked about physical activity and a healthy sleep routine.       Dimension #3 - Emotional/Behavioral/Cognitive - Risk 1. The patient does not have any formal mental health diagnoses however does report symptoms of depression. Patient has a history of abandonment issues as well as grew up in an alcoholic home. Patient also appears to struggle with anger and impulsivity as evidence by his pending legal charges (stabbing). He has reported some significant concerns with impulsive and compulsive behaviors. He reports he has been \"checking\" things and has been struggling with a deep need to feel clean. He reports he is also compulsively eating and that is posing some concerns for his physical health. He has been unable to obtain mental health services due to a lack of insurance ([place] of the [place] The patient has been given information for self pay clinics and was looking into these.   Specific goals from treatment plan addressed this week: Report to staff any changes in your mental health that may affect your attendance or participation in group therapy.   Effectiveness of strategies:  The patient reports that he is struggling with his emotions. He reports he has been having very negative self talk and emotionally beating himself up. He reports that he feels very bad for the crime he has committed. He identified that he is struggling with anger, boredom and sadness and needs a place to release these emotions. He was under significant pressure as he had a court date on 4/29/19.    Dimension #4 - Treatment Acceptance/Resistance - Risk 0. The patient is mandated to treatment by the courts however verbalizes motivation for sobriety.   Specific goals from treatment plan addressed this week: Attend Recovery Maintenance groups Thursday from 6:00pm to " 8:00pm and share thoughts, feelings and urges to use, as well as sober supports with staff and peers.  Effectiveness of strategies: The patient is a very active and engaged group member. He reports he is motivated for treatment at this time and wants to be sober.      Dimension #5 - Relapse Potential - Risk 2. The patient has some coping skills and knowledge of addiction concepts. This is the patients first formal treatment. He has been able to identify triggers for use and how his use has impacted his life.   Specific goals from treatment plan addressed this week: Dealing effectively with relapse triggers and stressors while building coping skills in order to handle life events without resorting to drug/alcohol use.   Effectiveness of strategies: The patient has joked about use over the last week- we talked about his joking about using being a defense and possible response to urges. He reports he has been eating for a coping skill and this is concerning as he struggles with impulsive and compulsive eating. He reports he has not used over the last week.       Dimension #6 - Recovery Environment - Risk 2. The patient is residing at [facility]. He is currently on house arrest and is compliant with the monitoring system. The patient has a pending case for a stabbing and is on conditional release. Patient is currently working at [employer]  on the day shift. He does not have any current support outside of his [facility], reporting that all of his family resides in the [facility] and minimal sober support group attendance.   Specific goals from treatment plan addressed this week: Work on legal concerns.    Effectiveness of strategies:  The patient had 2 scheduled meetings with his  over the last week, he reports he has been feeling anxious about his court case. His court date was today (4/29/19)     T) Treatment plan updated no.  Patient notified and in agreement NA.   Patient has completed 84 hours of DOP. Pt  has completed 24 hours of Recovery Maintenance aftercare.  Projected discharge date is 5/30/2019. Current discharge plan is TBD.      Steph Melo MA, [facility]    4/22/2019, 11:40 AM        Recovery Maintenance Aftercare  Psycho-Educational Curriculum  Date Attended  Mindfulness Curriculum  Date Attended    Self Care  4/11 Neurobiology of Mindfulness     Staying motivated in Long Term Recovery-h/o   Core Attitudes     Sleep  3/28 Daily practice     Relapse Prevention Quiz-h/o   Awareness     Prioritizing Sober Living-24hours a day h/o   Thoughts     Symptoms of Relapse-h/o   Emotions     Tools for Recovery-h/o   Physical body     Acceptance-group exercise   Intention     Finding gratitude   C.A.L.M.     GOALS  4/4 Clarity     POT  3/7/19       Sober support groups  4/18/19 Optimism     Have a Relapse Prevention Plan-written   More Mind  2/28

## 2021-05-28 NOTE — PROGRESS NOTES
[facility] SUBSTANCE USE DISORDER  DISCHARGE SUMMARY    Name:  Jaspal Soler   :  ROSENDO Matias   Admit Date: 12/3/18   Discharge Date: 19   :  1985   Hours Completed: 106   Initial Diagnosis:  Cannabis Use Disorder, Severe and Alcohol Use Disorder, Severe   Final Diagnosis:  Cannabis Use Disorder, Severe and Alcohol Use Disorder, Severe    Discharge Address:    [address] [place] [address] Funding Source:    [facility]     Discharge Type:  With Staff Approval (WSA)    Client was receiving residential services at the time of discharge:   No    Reasons for and circumstances of service termination:  The patient completed both the day outpatient and recovery maintenance programing at [facility]. He met all treatment goals and expectations.      If program discharge status was At Staff Request, the license [place] must identify the following:    Other interested parties conferred with: NA    Referrals provided: NA    Alternatives considered and attempted before deciding to discharge:  NA    Dimension/Course of Treatment/Individualized Care:   1.  Withdrawal Potential - Intake Risk level -  0 Discharge Risk level - 0  Narrative supporting risk description:  The patient reports his date of last use of both alcohol and cannabis to be on 8/10/19.   Treatment plan goals and progress towards those goals:  The patient goal was to maintain abstinence while enrolled- he was successful in this as evidence by his maintained abstinence and report of no withdrawal concerns.    2.  Biomedical Conditions and Complications - Intake Risk level -  0 Discharge Risk level - 0  Narrative supporting risk description:  The patient reported no emergent biomedical concerns. He did not have insurance so lacks access to services other than through emergency room visit. He reports he was working on obtaining insurance.   Treatment plan goals and progress towards those  goals:  The patient goal was to manage biomedical concerns- he did not report any new or emergent concerns while enrolled. We did apply for medical assistance while enrolled and the application was pending with need of proof of income.    3.  Emotional/Behavioral/Cognitive Conditions and Complications - Intake Risk level -  1 Discharge Risk level - 2  Narrative supporting risk description:  The patient has no formal mental health diagnoses however reported a strong desire to engage in mental health services. His lack of insurance was a significant barrier for him to obtain mental health services while enrolled. The patient does have significant anger issues as evidence by his criminal activities and reports needing to find ways to manage his anger. The patient reported issues with compulsive and impulsive behaviors as well specifically related to eating and cleaning/hygine routines. While enrolled he did not endorse any SI/SIB/HI.    Treatment plan goals and progress towards those goals:  The patient goal was to identify and manage his mental health concerns. He was partially successful in this however did experience some barriers related to having a lack of insurance. The recommendation at this time would be for the patient to obtain insurance and receive mental health services.    4.  Readiness for Change - Intake Risk level -  1 Discharge Risk level - 0  Narrative supporting risk description:  The patient was originally motivated through [facility] services and Conditional Release monitoring however while enrolled he verbalized a desire to engage in services on his own and was a fantastic group member. He was active and engaged in services and completed all programing as requested.   Treatment plan goals and progress towards those goals:  The patient goal was to follow through with intentions to treat chemical dependency concerns-he was successful in this as evidence by his completion of both the day outpatient  program and recovery maintenance programing.    5.  Relapse/Continued Use/Continued Problem Potential - Intake Risk level -  2 Discharge Risk level - 1  Narrative supporting risk description:  The patient has understanding and knowledge of addiction concepts and relapse prevention. He has coping skills to utilize for both his metal health and addiction. He is at a mild risk of relapse.   Treatment plan goals and progress towards those goals:  The patient goal was to develop and implement coping skills. He was successful in this as he was able to manage his anger and reported no use while enrolled.    6.  Recovery Environment - Intake Risk level -  2 Discharge Risk level - 1  Narrative supporting risk description:  The patient recently moved back to [place] where his family is residing at this time. He completed all court obligations and is a free man. He is looking for employment in [place] and reporting that his family is supportive of him and his sobriety. He reports he is engaging in sober support groups and has stable housing.   Treatment plan goals and progress towards those goals:  The patient goal as to develop a sober structured routine. He was successful in this as evidence by his ability to mange all of his legal concerns and continue to engage in work and sober support groups.    Strengths: Good listener, kind, hard working  Needs: Mental health support and services.    Services Provided: Intake, assessment, treatment planning, education, group discussion, film, lectures, 1x1 therapy, and recommendations at discharge.      Program Involvement: Good  Attendance: Excellent  Ability to relate in group/   Other program activities: Excellent  Assignment Completion: Fair  Overall Behavior: Good  Reported Family/Significant   Other Involvement: NA    Prognosis: Good      Recommendations       Attend 12 Step Meetings, Identify and Maintain a Sober Social and Network of Friends    Mental Health Referral  Mental  Health Evaluation, Individual Therapy and Med Compliance      Physical Health Referral:  Obtain insurance and primary care provider.        Counselor Name and Title:  ROSENDO Matias        Date:  5/14/2019  Time:  3:18 PM
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
## 2021-06-22 NOTE — PROGRESS NOTES
Addiction Services - Initial Services Plan     Patient  Name: Jaspal Soler  MRN: [id number]   : 1985  Admit Date: 12/3/18       Patient describes their immediate need: To learn recovery skills to prevent relapse.     Are there any immediate Safety Needs such as (physical, stability, mobility):  Pt is able to get medical care as needed. Pt denies immediate concerns.     Immediate Health Needs and Plan:   None noted  Vulnerable Adult: No     Issues to be addressed in the first sessions:   Introduction to group  Review Treatment Plan    Patient strengths and needs:   Strengths identified as I want to get better  Needs identified as learn about my self    Plan for patient for time between intake and completion of the treatment plan:   Attend all group therapy sessions as directed, complete all written and oral assignments as directed, and remain clean and sober. A relapse, if any, must be reported to staff immediately in order to ensure you are receiving the proper level of care. If you cannot attend a group therapy session you must call contact information provided in intake folder and leave a message before or during group hours.           Vulnerable Adult Review   [X] Review of the Facility Abuse Prevention plan was reviewed with the patient   [X] No Individual Abuse Plan is necessary   [ ] In addition to the Facility Abuse Prevention plan, an Individual Abuse Plan will be put in place       Staff Name/Title: ROSENDO Valentino   Date: 12/3/2018  Time: 2:20 PM

## 2021-06-22 NOTE — PROGRESS NOTES
Jaspal Soler attended 3 hours of group therapy today.    Total group size of 6.    12/28/2018 12:58 PM Rao Garzon

## 2021-06-22 NOTE — PROGRESS NOTES
Jaspal Soler attended 3 hours of group therapy today.    Total group size of 7.    1/3/2019 2:36 PM Rao Garzon
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
## 2021-06-22 NOTE — PROGRESS NOTES
Jaspal Soler attended 3 hours of group therapy today.    Total group size of 8.    12/17/2018 2:37 PM Rao Garzon

## 2021-06-22 NOTE — PROGRESS NOTES
Jaspal Soler attended 3 hours of group therapy today.    Total group size of 9.    12/7/2018 1:33 PM Rao Garzon

## 2021-06-22 NOTE — PROGRESS NOTES
Jaspal Soler attended 3 hours of group therapy today.    Total group size of 9.    12/19/2018 1:57 PM Rao Garzon

## 2021-06-22 NOTE — PROGRESS NOTES
Jaspal Soler attended 3 hours of group therapy today.    Total group size of 6.    12/13/2018 1:01 PM Rao Garzon
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
## 2021-06-22 NOTE — PROGRESS NOTES
Jaspal Soler attended 2 hours of group therapy today.    Total group size of 12.    1/4/2019 1:40 PM Rao Garzon

## 2021-06-22 NOTE — PROGRESS NOTES
Jaspal Soler attended 3 hours of group therapy today. Patient presented his use history to group today.    Total group size of 9.    12/20/2018 2:59 PM Rao Garzon
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
## 2021-06-22 NOTE — PROGRESS NOTES
Jaspal Soler attended 3 hours of group therapy today.    Total group size of 7.    12/26/2018 1:39 PM Rao Garzon

## 2021-06-22 NOTE — PROGRESS NOTES
Jaspal Soler attended 3 hours of group therapy today.    Total group size of 12.    1/9/2019 1:54 PM Rao Garzon

## 2021-06-22 NOTE — PROGRESS NOTES
Jaspal Soler attended 3 hours of group therapy today. Patient started first DOP group session today.    Total group size of 7.    12/5/2018 2:38 PM Rao Garzon
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
## 2021-06-22 NOTE — PROGRESS NOTES
Jaspal Soler attended 3 hours of group therapy today.    Total group size of 9.    1/7/2019 1:35 PM Rao Garzon

## 2021-06-22 NOTE — TELEPHONE ENCOUNTER
Staff faxed copies of application to [facility] for health insurance for 2019, per patient's request.    ROSENDO Macias

## 2021-06-22 NOTE — PROGRESS NOTES
Jaspal Soler attended 3 hours of group therapy today.    Total group size of 9.    12/31/2018 12:51 PM Rao Garzon

## 2021-06-22 NOTE — PROGRESS NOTES
Jaspal Soler attended 3 hours of group therapy today.    Total group size of 8.    12/21/2018 2:01 PM Rao Garzon

## 2021-06-22 NOTE — PROGRESS NOTES
Jaspal Soler attended 3 hours of group therapy today.    Total group size of 9.    12/24/2018 12:52 PM Rao Garzon

## 2021-06-22 NOTE — PROGRESS NOTES
Jaspal Soler attended 3 hours of group therapy today.    Total group size of 7.    12/10/2018 1:40 PM Rao Garzon

## 2021-06-22 NOTE — PROGRESS NOTES
Jaspal Soler attended 3 hours of group therapy today.    Total group size of 8.    12/6/2018 2:45 PM Rao Garzon

## 2021-06-22 NOTE — PROGRESS NOTES
Jaspal Soler attended 3 hours of group therapy today.    Total group size of 7.    12/14/2018 2:27 PM Rao Garzon

## 2021-06-22 NOTE — PROGRESS NOTES
Jaspal Soler attended 3 hours of group therapy today.    Total group size of 5.    12/27/2018 12:46 PM Rao Garzon

## 2021-06-23 NOTE — PROGRESS NOTES
Jaspal Soler attended 3 hours of group therapy today.    Total group size of 9.    1/11/2019 12:44 PM Rao Garzon

## 2021-06-23 NOTE — TELEPHONE ENCOUNTER
Faxed patient's treatment update to [facility], per patient's request. Patient has completed 84 hours of DOP treatment on this date as required, and will continue treatment in recovery maintenance program on Thursdays, from 9:30 to 11:30 AM.    BOBBY Macias, [facility]
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
## 2021-06-23 NOTE — PROGRESS NOTES
Jaspal Soler attended 3 hours of group therapy today.    Total group size of 9.    1/10/2019 2:58 PM Rao Garzon

## 2021-06-23 NOTE — PROGRESS NOTES
Jaspal Soler attended 3 hours of group therapy today.    Total group size of 13.    1/16/2019 12:46 PM Rao Garzon
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
## 2021-06-23 NOTE — PROGRESS NOTES
Jaspal Soler attended 3 hours of group therapy today.    Total group size of 10.    1/31/2019 3:27 PM Rao Garzon

## 2021-06-23 NOTE — PROGRESS NOTES
Jaspal Soler attended 3 hours of group therapy today.    Total group size of 12.    1/21/2019 2:03 PM Rao Garzon

## 2021-06-23 NOTE — PROGRESS NOTES
Jaspal Soler attended 1 hours of group therapy today.    Total group size of 10.    1/18/2019 1:54 PM Rao Garzon
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
## 2021-06-23 NOTE — PROGRESS NOTES
Jaspal Soler attended 3 hours of group therapy today.    Total group size of 10.    1/23/2019 1:28 PM Rao Garzon

## 2021-06-23 NOTE — PROGRESS NOTES
Jaspal Soler attended 3 hours of group therapy today.    Total group size of 9.    1/28/2019 1:44 PM Rao Garzon

## 2021-06-23 NOTE — PROGRESS NOTES
Jaspal Soler attended 2 hours of group therapy today.    Total group size of 5.    2/7/2019 8:17 PM Shazia Lange

## 2021-06-23 NOTE — PROGRESS NOTES
Jaspal Soler attended 3 hours of group therapy today.    Total group size of 7.    1/17/2019 3:11 PM Rao Garzon
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
## 2021-06-23 NOTE — PROGRESS NOTES
Jaspal Soler attended 3 hours of group therapy today.    Total group size of 15.    1/14/2019 1:55 PM Rao Garzon
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
## 2021-06-24 NOTE — PROGRESS NOTES
Jaspal Soler attended 2 hours of group therapy today.    Total group size of 11.    2/21/2019 8:27 PM Shazia Lange

## 2021-06-24 NOTE — PROGRESS NOTES
Jaspal Soler attended 2 hours of group therapy today.    Total group size of 8.    2/14/2019 8:19 PM Shazia Lange
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
## 2021-06-24 NOTE — PROGRESS NOTES
Jaspal Soler attended 2 hours of group therapy today.    Total group size of 5.    3/7/2019 8:02 PM Shazia Lange

## 2021-06-24 NOTE — PROGRESS NOTES
Weekly Progress Note  Jaspal Soler  1985  [id number]    D) Pt attended 1 groups this week with 0 absences. Patient attended 0 individual sessions this week.   A) Staff facilitated groups and reviewed tx progress. [facility].   R) No VAP needed at this time.   Any significant events, defines as events that impact patients relationship with others inside and outside of treatment Yes: The patient resides a significant distance from his family, he reports significant loneliness due to this. Patient also has pending charges for a stabbing.     Indicate any changes or monitoring of physical or mental health problems No    Indicate involvement by any outside supports Yes    IAPP reviewed and modified as needed. NA  Pt working on the following dimensions:    Dimension #1 - Withdrawal Potential - Risk 0. The patient reports his date of last use of marijuana and alcohol to be on 8/10/19.  Specific goals from treatment plan addressed this week:Maintain abstinence while attending Recovery Maintenance as a way of gaining awareness of your thoughts, feelings and aspirations for recovery. Report any relapses, if any, on any substances of abuse to staff immediately.   Effectiveness of strategies: The patient continues to report sobriety.      Dimension #2 - Biomedical - Risk 0. The patient does not endorse any emergent biomedical concerns. He does not have current health insurance and only access to medical care is through an ED.   Specific goals from treatment plan addressed this week:Get proper rest, nutrition, and exercise in order to promote good brain and body function. Inform staff immediately of any changes in your health that may affect your active participation in group therapy or attendance.  Effectiveness of strategies: No biomedical concerns at this time.     Dimension #3 - Emotional/Behavioral/Cognitive - Risk 1. The patient does not have any formal mental health diagnoses however does report symptoms of  depression. Patient has a history of abandonment issues as well as grew up in an alcoholic home. Patient also appears to struggle with anger and impulsivity as evidence by his pending legal charges (stabbing).   Specific goals from treatment plan addressed this week:   Report to staff any changes in your mental health that may affect your attendance or participation in group therapy.   Effectiveness of strategies: The patient reported that over the last week he had to change his mindset, he stated he needed to feel positive and has been thinking happy thoughts more often.       Dimension #4 - Treatment Acceptance/Resistance - Risk 0. The patient is mandated to treatment by the courts however verbalizes motivation for sobriety.   Specific goals from treatment plan addressed this week:  Attend Recovery Maintenance groups Thursday from 6:00pm to 8:00pm and share thoughts, feelings and urges to use, as well as sober supports with staff and peers.  Effectiveness of strategies: The patient is engaged and active in the group setting.    Dimension #5 - Relapse Potential - Risk 2. The patient has some coping skills and knowledge of addiction concepts. This is the patients first formal treatment. He has been able to identify triggers for use and how his use has impacted his life.   Specific goals from treatment plan addressed this week:  Dealing effectively with relapse triggers and stressors while building coping skills in order to handle life events without resorting to drug/alcohol use.   Effectiveness of strategies: No urges to use, his ankle bracelet is a good motivator for his sobreity as well as the prospect of getting off of house arrest.      Dimension #6 - Recovery Environment - Risk 2. The patient is residing at [facility]. He is currently on house arrest and is compliant with the monitoring system. The patient has a pending case for a stabbing and is on conditional release. Patient is currently working at  [employer]  on the day shift. He does not have any current support outside of his sober house, [place] and minimal sober support group attendance.   Specific goals from treatment plan addressed this week:  Find 2 sober support groups you feel comfortable attending on a weekly basis and inform counselor how these meetings are impacting you.   Effectiveness of strategies:  The patient continues to report no sober support group attendance. He did engage with his sober house peers and found it nice to spend time with them.     T) Treatment plan updated no.  Patient notified and in agreement NA.   Patient has completed 84 hours of 84. Pt has completed 8 hours of Recovery Maintenance aftercare.  Projected discharge date is 4/26/2019. Current discharge plan is TBD.     ROSENDO Matias  3/4/2019, 3:37 PM      Recovery Maintenance Aftercare  Psycho-Educational Curriculum  Date Attended  Mindfulness Curriculum  Date Attended    Crossing over into Sober Living--Pat Alcoholism h/o  Neurobiology of Mindfulness    Staying motivated in Long Term Recovery-h/o  Core Attitudes    Keeping it Simple-h/o  Daily practice    Relapse Prevention Quiz-h/o  Awareness    Prioritizing Sober Living-24hours a day h/o  Thoughts    Symptoms of Relapse-h/o  Emotions    Tools for Recovery-h/o  Physical body     Acceptance-group exercise  Intention    Finding gratitude  TONY.    Building sober habits  Clarity    Sober support groups  Optimism    Have a Relapse Prevention Plan-written  More [facility]  2/28

## 2021-06-24 NOTE — PROGRESS NOTES
Jaspal Soler attended 2 hours of group therapy today.    Total group size of 7.    2/28/2019 8:12 PM Shazia Lange

## 2021-06-24 NOTE — PROGRESS NOTES
Weekly Progress Note  Jaspal Soler  1985  [id number]    D) Pt attended 1 groups this week with 0 absences. Patient attended 0 individual sessions this week.   A) Staff facilitated groups and reviewed tx progress. Assessed for [facility].   R) No VAP needed at this time.   Any significant events, defines as events that impact patients relationship with others inside and outside of treatment Yes: The patient resides a significant distance from his family, he reports significant loneliness due to this. Patient also has pending charges for a stabbing.     Indicate any changes or monitoring of physical or mental health problems No    Indicate involvement by any outside supports Yes    IAPP reviewed and modified as needed. NA  Pt working on the following dimensions:    Dimension #1 - Withdrawal Potential - Risk 0. The patient reports his date of last use of marijuana and alcohol to be on 8/10/19.  Specific goals from treatment plan addressed this week: Maintain abstinence while attending Recovery Maintenance as a way of gaining awareness of your thoughts, feelings and aspirations for recovery. Report any relapses, if any, on any substances of abuse to staff immediately.   Effectiveness of strategies: The patient reports no use over the last week and is not endorsing any concerns.      Dimension #2 - Biomedical - Risk 0. The patient does not endorse any emergent biomedical concerns. He does not have current health insurance and only access to medical care is through an ED.   Specific goals from treatment plan addressed this week:Get proper rest, nutrition, and exercise in order to promote good brain and body function. Inform staff immediately of any changes in your health that may affect your active participation in group therapy or attendance.  Effectiveness of strategies: The patient is not endorsing any new or emergent biomedical concerns. He reports he worked a lot over the last week which was good for his physical  health.      Dimension #3 - Emotional/Behavioral/Cognitive - Risk 1. The patient does not have any formal mental health diagnoses however does report symptoms of depression. Patient has a history of abandonment issues as well as grew up in an alcoholic home. Patient also appears to struggle with anger and impulsivity as evidence by his pending legal charges (stabbing).   Specific goals from treatment plan addressed this week:   Report to staff any changes in your mental health that may affect your attendance or participation in group therapy.   Effectiveness of strategies: The patient reported one significant circumstance where he had an urge for anger, he stated that he lashed out at the temp worker because they told him they had work for him but it ended up that he was not needed. The patient reports low self esteem and has been trying to catch himself in negative thinking. He NEEDS a therapist however his insurance has yet to go through.      Dimension #4 - Treatment Acceptance/Resistance - Risk 0. The patient is mandated to treatment by the courts however verbalizes motivation for sobriety.   Specific goals from treatment plan addressed this week:  Attend Recovery Maintenance groups Thursday from 6:00pm to 8:00pm and share thoughts, feelings and urges to use, as well as sober supports with staff and peers.  Effectiveness of strategies: The patient is a great group member and is very active in the group setting.     Dimension #5 - Relapse Potential - Risk 2. The patient has some coping skills and knowledge of addiction concepts. This is the patients first formal treatment. He has been able to identify triggers for use and how his use has impacted his life.   Specific goals from treatment plan addressed this week:  Dealing effectively with relapse triggers and stressors while building coping skills in order to handle life events without resorting to drug/alcohol use.   Effectiveness of strategies: The patient  reports no urges to use over the last week. He is motivated for treatment.      Dimension #6 - Recovery Environment - Risk 2. The patient is residing at [facility]. He is currently on house arrest and is compliant with the monitoring system. The patient has a pending case for a stabbing and is on conditional release. Patient is currently working at [employer]  on the day shift. He does not have any current support outside of his [facility] house, reporting [place] and minimal sober support group attendance.   Specific goals from treatment plan addressed this week:  Find 2 sober support groups you feel comfortable attending on a weekly basis and inform counselor how these meetings are impacting you.   Effectiveness of strategies:  No sober support group attendance. He reports his court case got pushed to 4/27/19, he is very scared of the consequences as he has 2 pending stabbing charges and the minimum recommended time is 6 years in assisted.     T) Treatment plan updated no.  Patient notified and in agreement NA.   Patient has completed 84 hours of 84. Pt has completed 10 hours of Recovery Maintenance aftercare.  Projected discharge date is 4/26/2019. Current discharge plan is TBD.     ROSENDO Matias  3/12/2019, 8:14 AM      Recovery Maintenance Aftercare  Psycho-Educational Curriculum  Date Attended  Mindfulness Curriculum  Date Attended    Crossing over into Sober Living--Pat Alcoholism h/o  Neurobiology of Mindfulness    Staying motivated in Long Term Recovery-h/o  Core Attitudes    Keeping it Simple-h/o  Daily practice    Relapse Prevention Quiz-h/o  Awareness    Prioritizing Sober Living-24hours a day h/o  Thoughts    Symptoms of Relapse-h/o  Emotions    Tools for Recovery-h/o  Physical body     Acceptance-group exercise  Intention    Finding gratitude  MANISH    Building sober habits  Clarity    POT  3/7/19     Sober support groups  Optimism    Have a Relapse  Prevention Plan-written  [facility]  2/28

## 2021-06-25 NOTE — PROGRESS NOTES
Weekly Progress Note  Jaspal Soler  1985  [id number]    D) Pt attended 1 groups this week with 0 absences. Patient attended 0 individual sessions this week.   A) Staff facilitated groups and reviewed tx progress. [facility] for [facility].   R) No VAP needed at this time.   Any significant events, defines as events that impact patients relationship with others inside and outside of treatment Yes: The patient resides a significant distance from his family, he reports significant loneliness due to this. Patient also has pending charges for a stabbing.     Indicate any changes or monitoring of physical or mental health problems No    Indicate involvement by any outside supports Yes    IAPP reviewed and modified as needed. NA  Pt working on the following dimensions:    Dimension #1 - Withdrawal Potential - Risk 0. The patient reports his date of last use of marijuana and alcohol to be on 8/10/19.  Specific goals from treatment plan addressed this week: Maintain abstinence while attending Recovery Maintenance as a way of gaining awareness of your thoughts, feelings and aspirations for recovery. Report any relapses, if any, on any substances of abuse to staff immediately.   Effectiveness of strategies: The patient has reported no use over the last week.       Dimension #2 - Biomedical - Risk 0. The patient does not endorse any emergent biomedical concerns. He does not have current health insurance and only access to medical care is through an ED.   Specific goals from treatment plan addressed this week: Get proper rest, nutrition, and exercise in order to promote good brain and body function. Inform staff immediately of any changes in your health that may affect your active participation in group therapy or attendance.  Effectiveness of strategies: The patient reports no emergent biomedical concerns at this time.       Dimension #3 - Emotional/Behavioral/Cognitive - Risk 1. The patient does not have any formal mental health  "diagnoses however does report symptoms of depression. Patient has a history of abandonment issues as well as grew up in an alcoholic home. Patient also appears to struggle with anger and impulsivity as evidence by his pending legal charges (stabbing).   Specific goals from treatment plan addressed this week:   Report to staff any changes in your mental health that may affect your attendance or participation in group therapy.   Effectiveness of strategies: The patient is reporting manageable mental health at this time. He reports that he has been feeling emotions that are \"weird\" for him to handle at this time because he has never felt them and does not know how to respond to them at this time.     Dimension #4 - Treatment Acceptance/Resistance - Risk 0. The patient is mandated to treatment by the courts however verbalizes motivation for sobriety.   Specific goals from treatment plan addressed this week:  Attend Recovery Maintenance groups Thursday from 6:00pm to 8:00pm and share thoughts, feelings and urges to use, as well as sober supports with staff and peers.  Effectiveness of strategies: The patient is a great group member and is very active in the group setting. No change.     Dimension #5 - Relapse Potential - Risk 2. The patient has some coping skills and knowledge of addiction concepts. This is the patients first formal treatment. He has been able to identify triggers for use and how his use has impacted his life.   Specific goals from treatment plan addressed this week:  Dealing effectively with relapse triggers and stressors while building coping skills in order to handle life events without resorting to drug/alcohol use.   Effectiveness of strategies: The patient reports no urges to use at this time. We did discuss urges for anger related to feeling disrespected by his co-workers. He utilized breathing techniques as a coping skill.      Dimension #6 - Recovery Environment - Risk 2. The patient is residing " at [facility]. He is currently on house arrest and is compliant with the monitoring system. The patient has a pending case for a stabbing and is on conditional release. Patient is currently working at [employer]  on the day shift. He does not have any current support [facility] of [facility], reporting that [place] and minimal sober support group attendance.   Specific goals from treatment plan addressed this week:  Find 2 sober support groups you feel comfortable attending on a weekly basis and inform counselor how these meetings are impacting you.   Effectiveness of strategies:  No sober support group attendance. He reports his court case got pushed to 4/27/19, he is very scared of the consequences as he has 2 pending stabbing charges and the minimum recommended time is 6 years in detention. No changes at this time.     T) Treatment plan updated no.  Patient notified and in agreement NA.   Patient has completed 84 hours of 84. Pt has completed 12 hours of Recovery Maintenance aftercare.  Projected discharge date is 4/26/2019. Current discharge plan is TBD.     ROSENDO Matias  3/18/2019, 11:01 AM      Recovery Maintenance Aftercare  Psycho-Educational Curriculum  Date Attended  Mindfulness Curriculum  Date Attended    Crossing over into Sober Living--Pat Alcoholism h/o  Neurobiology of Mindfulness    Staying motivated in Long Term Recovery-h/o  Core Attitudes    Keeping it Simple-h/o  Daily practice    Relapse Prevention Quiz-h/o  Awareness    Prioritizing Sober Living-24hours a day h/o  Thoughts    Symptoms of Relapse-h/o  Emotions    Tools for Recovery-h/o  Physical body     Acceptance-group exercise  Intention    Finding gratitude  MANISH    Building sober habits  Clarity    POT  3/7/19     Sober support groups  Optimism    Have a Relapse Prevention Plan-written  [facility]  2/28

## 2021-06-25 NOTE — PROGRESS NOTES
Jaspal Soler attended 2 hours of group therapy today.    Total group size of 10.    3/14/2019 8:22 PM Shazia Lange

## 2021-06-25 NOTE — PROGRESS NOTES
Jaspal Soler attended 2 hours of group therapy today.    Total group size of 8.    3/21/2019 8:21 PM Shazia Lange
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
